# Patient Record
Sex: MALE | Race: WHITE | Employment: FULL TIME | URBAN - METROPOLITAN AREA
[De-identification: names, ages, dates, MRNs, and addresses within clinical notes are randomized per-mention and may not be internally consistent; named-entity substitution may affect disease eponyms.]

---

## 2017-03-24 PROCEDURE — 86803 HEPATITIS C AB TEST: CPT | Performed by: FAMILY MEDICINE

## 2017-03-24 PROCEDURE — 36415 COLL VENOUS BLD VENIPUNCTURE: CPT | Performed by: FAMILY MEDICINE

## 2018-01-26 PROCEDURE — 82570 ASSAY OF URINE CREATININE: CPT | Performed by: FAMILY MEDICINE

## 2018-01-26 PROCEDURE — 82043 UR ALBUMIN QUANTITATIVE: CPT | Performed by: FAMILY MEDICINE

## 2018-06-17 ENCOUNTER — HOSPITAL ENCOUNTER (EMERGENCY)
Age: 63
Discharge: HOME | End: 2018-06-17
Attending: EMERGENCY MEDICINE
Payer: COMMERCIAL

## 2018-06-17 VITALS
HEART RATE: 69 BPM | WEIGHT: 215 LBS | TEMPERATURE: 98 F | HEIGHT: 71 IN | RESPIRATION RATE: 18 BRPM | DIASTOLIC BLOOD PRESSURE: 76 MMHG | SYSTOLIC BLOOD PRESSURE: 122 MMHG | OXYGEN SATURATION: 98 % | BODY MASS INDEX: 30.1 KG/M2

## 2018-06-17 DIAGNOSIS — S61.411A LACERATION OF RIGHT HAND WITHOUT FOREIGN BODY, INITIAL ENCOUNTER: Primary | ICD-10-CM

## 2018-06-17 PROCEDURE — 99283 EMERGENCY DEPT VISIT LOW MDM: CPT

## 2018-06-17 PROCEDURE — 12001 RPR S/N/AX/GEN/TRNK 2.5CM/<: CPT

## 2018-06-17 NOTE — ED PROVIDER NOTES
Patient Seen in: [facility] Emergency Department In [place]    History   Patient presents with:  Laceration Abrasion (integumentary)    Stated Complaint: laceration to right hand from nail gun, bleeding controlled, unknown last tetan*    HPI    62-year-ol Vital signs were reviewed per nurse's notes. Right upper extremity: Neurovascularly intact with full range of motion.   There is a 2 cm laceration in the webspace between the index finger and the thumb through skin and subcutaneous tissue without apparent

## 2018-06-17 NOTE — ED INITIAL ASSESSMENT (HPI)
Pt states he was using a nail gun this morning and ended up \"cutting his hand\". Pt has small laceration noted to right hand. Bleeding controlled. Last tetanus shot unknown. + CMS noted.

## (undated) NOTE — ED AVS SNAPSHOT
Lara Mullins   MRN: [id number]    Department:  [facility] Emergency Department in [place]   Date of Visit:  6/17/2018           Disclosure     Insurance plans vary and the physician(s) referred by the ER may not be covered by your plan.  Please contact y tell this physician (or your personal doctor if your instructions are to return to your personal doctor) about any new or lasting problems. The primary care or specialist physician will see patients referred from the [facility] Emergency Department.  Salvadore Skiff